# Patient Record
Sex: MALE | Race: OTHER | HISPANIC OR LATINO | URBAN - METROPOLITAN AREA
[De-identification: names, ages, dates, MRNs, and addresses within clinical notes are randomized per-mention and may not be internally consistent; named-entity substitution may affect disease eponyms.]

---

## 2023-02-23 ENCOUNTER — EMERGENCY (EMERGENCY)
Facility: HOSPITAL | Age: 23
LOS: 0 days | Discharge: HOME | End: 2023-02-23
Attending: STUDENT IN AN ORGANIZED HEALTH CARE EDUCATION/TRAINING PROGRAM
Payer: SELF-PAY

## 2023-02-23 VITALS
HEART RATE: 105 BPM | TEMPERATURE: 98 F | SYSTOLIC BLOOD PRESSURE: 118 MMHG | HEIGHT: 63 IN | WEIGHT: 139.99 LBS | OXYGEN SATURATION: 96 % | DIASTOLIC BLOOD PRESSURE: 73 MMHG | RESPIRATION RATE: 16 BRPM

## 2023-02-23 VITALS
DIASTOLIC BLOOD PRESSURE: 73 MMHG | SYSTOLIC BLOOD PRESSURE: 131 MMHG | HEART RATE: 88 BPM | RESPIRATION RATE: 20 BRPM | TEMPERATURE: 97 F | OXYGEN SATURATION: 99 %

## 2023-02-23 DIAGNOSIS — Z59.01 SHELTERED HOMELESSNESS: ICD-10-CM

## 2023-02-23 DIAGNOSIS — F10.129 ALCOHOL ABUSE WITH INTOXICATION, UNSPECIFIED: ICD-10-CM

## 2023-02-23 LAB — GLUCOSE BLDC GLUCOMTR-MCNC: 100 MG/DL — HIGH (ref 70–99)

## 2023-02-23 PROCEDURE — 72125 CT NECK SPINE W/O DYE: CPT | Mod: 26,MA

## 2023-02-23 PROCEDURE — 99284 EMERGENCY DEPT VISIT MOD MDM: CPT

## 2023-02-23 PROCEDURE — 70450 CT HEAD/BRAIN W/O DYE: CPT | Mod: 26,MA

## 2023-02-23 PROCEDURE — 99053 MED SERV 10PM-8AM 24 HR FAC: CPT

## 2023-02-23 SDOH — ECONOMIC STABILITY - HOUSING INSECURITY: SHELTERED HOMELESSNESS: Z59.01

## 2023-02-23 NOTE — ED PROVIDER NOTE - PROGRESS NOTE DETAILS
Michaela: Pt care signed out to me at change of shift, pending sober re-eval. On re-eval, pt clinically sober w/ clear speech and steady gait. Pt given breakfast meal in ED. Stable for d/c to shelter. Recommend routine PCP f/u. Return signs / symptoms d/w pt. He understands / agrees w/ this plan. Michaela: Pt care signed out to me at change of shift, pending sober re-eval. On re-eval, pt clinically sober w/ clear speech and steady gait. Pt given breakfast meal in ED. ANM provided jacket / pants and SW set up transportation for pt. Stable for d/c to shelter. Recommend routine PCP f/u. Return signs / symptoms d/w pt. He understands / agrees w/ this plan.

## 2023-02-23 NOTE — ED PROVIDER NOTE - CLINICAL SUMMARY MEDICAL DECISION MAKING FREE TEXT BOX
Presenting with intoxication 2/2 etoh.  Denies complaints.  Initially placed on 1:1 2/2 possible elopement risk but no apparent danger to self or others.  Denies complaints.  No obvious signs of trauma or gross deformities.  Will get CT given etoh use, poor historian.  Monitor for sobriety, reassess, and likely dc.

## 2023-02-23 NOTE — ED PROVIDER NOTE - OBJECTIVE STATEMENT
21yo male withno reported pmh presenting intoxicated.  Patient interviewed with Croatian speaking staff.  Denies complaints, endorsed drinking alcohol earlier.  No reported trauma.  Patient very tired on my exam.  Was reportedly found by staff at shelter sleeping against the wall.  Earlier was trying to leave and get out of stretcher but now sleeping, resting comfortably in stretcher.

## 2023-02-23 NOTE — ED ADULT TRIAGE NOTE - CHIEF COMPLAINT QUOTE
As per EMS pt found sleeping against wall in shelter. Pt uncooperative in triage. As per  pt states he doesn't know why he's here, "I have no idea".

## 2023-02-23 NOTE — ED ADULT NURSE REASSESSMENT NOTE - NS ED NURSE REASSESS COMMENT FT1
patient asleep on stretcher. Breath sounds equal bilaterally. NAD noted. No pending orders at this time. Pending ct results

## 2023-02-23 NOTE — ED PROVIDER NOTE - PHYSICAL EXAMINATION
General appearance: Nontoxic appearing, conversant, afebrile    Eyes: anicteric sclerae, ISMAEL, EOMI   HENT: Atraumatic; oropharynx clear, MMM and no ulcerations, no pharyngeal erythema or exudate   Neck: Trachea midline; Full range of motion, supple   Pulm: CTA bl, normal respiratory effort and no intercostal retractions, normal work of breathing   CV: RRR, No murmurs, rubs, or gallops   Abdomen: Soft, non-tender, non-distended; no guarding or rebound   Extremities: No peripheral edema, no gross deformities, FROM x4, 5/5 MS x4, gross sensation intact    Skin: Dry, normal temperature, turgor and texture; no rash

## 2023-02-23 NOTE — ED PROVIDER NOTE - PATIENT PORTAL LINK FT
You can access the FollowMyHealth Patient Portal offered by [facility] by registering at the following website: [email or web address]. By joining [employer]’s FollowMyHealth portal, you will also be able to view your health information using other applications (apps) compatible with our system.

## 2023-02-23 NOTE — ED ADULT NURSE NOTE - OBJECTIVE STATEMENT
patient is BIB EMS sleeping against wall in shelter. Patient uncooperative with nurse assessment. Patient refusing to answer questions and states he does not belong here and wants to leave. Patient refuses to answer any additional questions and just repeats "I am leaving, let me leave and disappear". Patient stumbling when trying to leave Patient becomes teary eyed and refused to share why. Patient elopement risk. MD made aware and patient placed on 1:1 constant observation for patient safety.